# Patient Record
Sex: MALE
[De-identification: names, ages, dates, MRNs, and addresses within clinical notes are randomized per-mention and may not be internally consistent; named-entity substitution may affect disease eponyms.]

---

## 2024-02-12 ENCOUNTER — NON-APPOINTMENT (OUTPATIENT)
Age: 64
End: 2024-02-12

## 2024-02-12 ENCOUNTER — APPOINTMENT (OUTPATIENT)
Dept: FAMILY MEDICINE | Facility: CLINIC | Age: 64
End: 2024-02-12
Payer: MEDICARE

## 2024-02-12 VITALS
SYSTOLIC BLOOD PRESSURE: 125 MMHG | RESPIRATION RATE: 16 BRPM | OXYGEN SATURATION: 98 % | DIASTOLIC BLOOD PRESSURE: 80 MMHG | BODY MASS INDEX: 29.78 KG/M2 | HEART RATE: 50 BPM | HEIGHT: 70 IN | WEIGHT: 208 LBS

## 2024-02-12 DIAGNOSIS — Z12.5 ENCOUNTER FOR SCREENING FOR MALIGNANT NEOPLASM OF PROSTATE: ICD-10-CM

## 2024-02-12 DIAGNOSIS — Z86.59 PERSONAL HISTORY OF OTHER MENTAL AND BEHAVIORAL DISORDERS: ICD-10-CM

## 2024-02-12 DIAGNOSIS — E78.5 HYPERLIPIDEMIA, UNSPECIFIED: ICD-10-CM

## 2024-02-12 DIAGNOSIS — Z00.00 ENCOUNTER FOR GENERAL ADULT MEDICAL EXAMINATION W/OUT ABNORMAL FINDINGS: ICD-10-CM

## 2024-02-12 DIAGNOSIS — I10 ESSENTIAL (PRIMARY) HYPERTENSION: ICD-10-CM

## 2024-02-12 DIAGNOSIS — Z86.73 PERSONAL HISTORY OF TRANSIENT ISCHEMIC ATTACK (TIA), AND CEREBRAL INFARCTION W/OUT RESIDUAL DEFICITS: ICD-10-CM

## 2024-02-12 DIAGNOSIS — F19.90 OTHER PSYCHOACTIVE SUBSTANCE USE, UNSPECIFIED, UNCOMPLICATED: ICD-10-CM

## 2024-02-12 DIAGNOSIS — Z86.718 PERSONAL HISTORY OF OTHER VENOUS THROMBOSIS AND EMBOLISM: ICD-10-CM

## 2024-02-12 DIAGNOSIS — Z78.9 OTHER SPECIFIED HEALTH STATUS: ICD-10-CM

## 2024-02-12 PROCEDURE — G0438: CPT

## 2024-02-12 RX ORDER — LISINOPRIL 2.5 MG/1
2.5 TABLET ORAL
Qty: 90 | Refills: 3 | Status: ACTIVE | COMMUNITY
Start: 1900-01-01 | End: 1900-01-01

## 2024-02-12 RX ORDER — BUPRENORPHINE HYDROCHLORIDE, NALOXONE HYDROCHLORIDE 8; 2 MG/1; MG/1
8-2 FILM, SOLUBLE BUCCAL; SUBLINGUAL
Refills: 0 | Status: ACTIVE | COMMUNITY

## 2024-02-12 RX ORDER — ATORVASTATIN CALCIUM 20 MG/1
20 TABLET, FILM COATED ORAL
Qty: 90 | Refills: 3 | Status: ACTIVE | COMMUNITY
Start: 1900-01-01 | End: 1900-01-01

## 2024-02-12 RX ORDER — UBIDECARENONE/VIT E ACET 100MG-5
25 MCG CAPSULE ORAL
Refills: 0 | Status: ACTIVE | COMMUNITY

## 2024-02-12 NOTE — HISTORY OF PRESENT ILLNESS
[FreeTextEntry1] : Annual and establish care  [de-identified] : 63 year old male presenting to establish care and for an annual physical exam. Patient's former PMD has left the previous practice.  Former PMD: Dr.Alexis Chi PMH: HTN, HLD (lisinopril and statin was started due to stroke, never had elevated cholesterol or blood pressure), 15 years ago stroke - with residual peripherally vision loss bilaterally on the left, Recovering drug addict for 8 years. right LLE DVT - took blood thinner for 6 months (about 10yrs ago), [facility].  PSH: Lipoma removed 8yrs ago Allergies: Levaquin - hives  Follows with psychiatrist - Dr. Arleen Morrow - monthly, prescribed suboxone. Lexapro depression/anxiety/compulsion

## 2024-02-12 NOTE — HISTORY OF PRESENT ILLNESS
[FreeTextEntry1] : Annual and establish care  [de-identified] : 63 year old male presenting to establish care and for an annual physical exam. Patient's former PMD has left the previous practice.  Former PMD: Dr.Alexis Chi PMH: HTN, HLD (lisinopril and statin was started due to stroke, never had elevated cholesterol or blood pressure), 15 years ago stroke - with residual peripherally vision loss bilaterally on the left, Recovering drug addict for 8 years. right LLE DVT - took blood thinner for 6 months (about 10yrs ago), [facility].  PSH: Lipoma removed 8yrs ago Allergies: Levaquin - hives  Follows with psychiatrist - Dr. Arleen Morrow - monthly, prescribed suboxone. Lexapro depression/anxiety/compulsion

## 2024-02-12 NOTE — ASSESSMENT
[FreeTextEntry1] : 63 year old male presented for an annual physical exam.  routine blood work today, blood collected in the office. up to date with screening Referred for colonoscopy  Refilled lisinopril/atorvastatin  will call back with results.

## 2024-02-12 NOTE — HEALTH RISK ASSESSMENT
[Good] : ~his/her~  mood as  good [No] : In the past 12 months have you used drugs other than those required for medical reasons? No [No falls in past year] : Patient reported no falls in the past year [0] : 2) Feeling down, depressed, or hopeless: Not at all (0) [PHQ-2 Negative - No further assessment needed] : PHQ-2 Negative - No further assessment needed [Patient reported colonoscopy was normal] : Patient reported colonoscopy was normal [HIV test declined] : HIV test declined [Hepatitis C test declined] : Hepatitis C test declined [None] : None [With Family] : lives with family [# of Members in Household ___] :  household currently consist of [unfilled] member(s) [Retired] : retired [Single] : single [# Of Children ___] : has [unfilled] children [Feels Safe at Home] : Feels safe at home [Fully functional (bathing, dressing, toileting, transferring, walking, feeding)] : Fully functional (bathing, dressing, toileting, transferring, walking, feeding) [Fully functional (using the telephone, shopping, preparing meals, housekeeping, doing laundry, using] : Fully functional and needs no help or supervision to perform IADLs (using the telephone, shopping, preparing meals, housekeeping, doing laundry, using transportation, managing medications and managing finances) [Reports normal functional visual acuity (ie: able to read med bottle)] : Reports normal functional visual acuity [Former] : Former [> 15 Years] : > 15 Years [de-identified] : quit 8 years ago  [de-identified] : recovering drug addict - IV heroine, crack, coccaine - quit 8yrs ago, on suboxone now [de-identified] : None  [de-identified] : Bad  [[id number]] : 0 [Sexually Active] : not sexually active [High Risk Behavior] : no high risk behavior [Reports changes in hearing] : Reports no changes in hearing [Reports changes in vision] : Reports no changes in vision [Reports changes in dental health] : Reports no changes in dental health [ColonoscopyDate] : 01/14 [ColonoscopyComments] : Referred  [FreeTextEntry3] : 3 daughters

## 2024-02-12 NOTE — HEALTH RISK ASSESSMENT
[Good] : ~his/her~  mood as  good [No] : In the past 12 months have you used drugs other than those required for medical reasons? No [No falls in past year] : Patient reported no falls in the past year [0] : 2) Feeling down, depressed, or hopeless: Not at all (0) [PHQ-2 Negative - No further assessment needed] : PHQ-2 Negative - No further assessment needed [Patient reported colonoscopy was normal] : Patient reported colonoscopy was normal [HIV test declined] : HIV test declined [Hepatitis C test declined] : Hepatitis C test declined [None] : None [With Family] : lives with family [# of Members in Household ___] :  household currently consist of [unfilled] member(s) [Retired] : retired [Single] : single [# Of Children ___] : has [unfilled] children [Feels Safe at Home] : Feels safe at home [Fully functional (bathing, dressing, toileting, transferring, walking, feeding)] : Fully functional (bathing, dressing, toileting, transferring, walking, feeding) [Fully functional (using the telephone, shopping, preparing meals, housekeeping, doing laundry, using] : Fully functional and needs no help or supervision to perform IADLs (using the telephone, shopping, preparing meals, housekeeping, doing laundry, using transportation, managing medications and managing finances) [Reports normal functional visual acuity (ie: able to read med bottle)] : Reports normal functional visual acuity [Former] : Former [> 15 Years] : > 15 Years [de-identified] : quit 8 years ago  [de-identified] : recovering drug addict - IV heroine, crack, coccaine - quit 8yrs ago, on suboxone now [de-identified] : None  [de-identified] : Bad  [[id number]] : 0 [Sexually Active] : not sexually active [High Risk Behavior] : no high risk behavior [Reports changes in hearing] : Reports no changes in hearing [Reports changes in vision] : Reports no changes in vision [Reports changes in dental health] : Reports no changes in dental health [ColonoscopyDate] : 01/14 [ColonoscopyComments] : Referred  [FreeTextEntry3] : 3 daughters

## 2024-02-14 LAB
ALBUMIN SERPL ELPH-MCNC: 4.4 G/DL
ALP BLD-CCNC: 118 U/L
ALT SERPL-CCNC: 17 U/L
ANION GAP SERPL CALC-SCNC: 15 MMOL/L
AST SERPL-CCNC: 17 U/L
BILIRUB SERPL-MCNC: 0.3 MG/DL
BUN SERPL-MCNC: 23 MG/DL
CALCIUM SERPL-MCNC: 9.4 MG/DL
CHLORIDE SERPL-SCNC: 104 MMOL/L
CHOLEST SERPL-MCNC: 146 MG/DL
CO2 SERPL-SCNC: 24 MMOL/L
CREAT SERPL-MCNC: 1.32 MG/DL
EGFR: 60 ML/MIN/1.73M2
ESTIMATED AVERAGE GLUCOSE: 114 MG/DL
GLUCOSE SERPL-MCNC: 99 MG/DL
HBA1C MFR BLD HPLC: 5.6 %
HDLC SERPL-MCNC: 37 MG/DL
LDLC SERPL CALC-MCNC: 78 MG/DL
NONHDLC SERPL-MCNC: 109 MG/DL
POTASSIUM SERPL-SCNC: 4.5 MMOL/L
PROT SERPL-MCNC: 6.9 G/DL
PSA FREE FLD-MCNC: 28 %
PSA FREE SERPL-MCNC: 0.57 NG/ML
PSA SERPL-MCNC: 2.05 NG/ML
SODIUM SERPL-SCNC: 142 MMOL/L
TRIGL SERPL-MCNC: 184 MG/DL
TSH SERPL-ACNC: 2.44 UIU/ML

## 2024-03-21 ENCOUNTER — APPOINTMENT (OUTPATIENT)
Dept: GASTROENTEROLOGY | Facility: CLINIC | Age: 64
End: 2024-03-21
Payer: MEDICARE

## 2024-03-21 VITALS
HEART RATE: 50 BPM | HEIGHT: 70 IN | DIASTOLIC BLOOD PRESSURE: 80 MMHG | BODY MASS INDEX: 29.78 KG/M2 | SYSTOLIC BLOOD PRESSURE: 120 MMHG | WEIGHT: 208 LBS

## 2024-03-21 DIAGNOSIS — K64.8 OTHER HEMORRHOIDS: ICD-10-CM

## 2024-03-21 DIAGNOSIS — Z12.11 ENCOUNTER FOR SCREENING FOR MALIGNANT NEOPLASM OF COLON: ICD-10-CM

## 2024-03-21 DIAGNOSIS — Z86.010 PERSONAL HISTORY OF COLONIC POLYPS: ICD-10-CM

## 2024-03-21 PROCEDURE — 99204 OFFICE O/P NEW MOD 45 MIN: CPT

## 2024-03-21 RX ORDER — ESCITALOPRAM OXALATE 10 MG/1
10 TABLET, FILM COATED ORAL
Refills: 0 | Status: ACTIVE | COMMUNITY

## 2024-03-21 NOTE — HISTORY OF PRESENT ILLNESS
[FreeTextEntry1] :    This HPI reflects a summary and review of records : including previous and most recent  Labs, body imaging, consults and progress notes, operative and pathology reports, EKG reports, ED records, found in [facility],  [facility] and any additional records brought in by  the patient at the time of the visit.   PCP: Dr. Damon  64 yo M w h/o HTN, HLD, CVA, Anxiety/ Depression, s/p DVT Colon Polyps, Hemorrhoids  3/21/24    Today:  Feeling well, no c/o , CP, SOB/ GROSS, Cough, Wheeze, Palpitations, edema  3/21/24   Today:  H/o constipation from suboxone--> BMs# 1-2 w strain                            Now on Miralax 1 cap qd --> BMs;# 4qd  * Abd pain-->no * Nausea--> no * Vomit--> no * Early satiety--> no * Belching--> no * Hiccups--> no * Regurgitation--> no * Acid Taste / Water Brash--> no * Ht burn--> no * Dysphagia--> no * Throat Clearing--> no * Hoarseness--> no * Post-Nasal Drip--> no * Congestion--> no * Globus--> no * Cough--> no * Wheeze / PC-> -no * Diarrhea--> no * Bloating--> no * Incompl Evac--> no * Flatulence--> no * Gurgling--> no * Melena--> no * BPBPR-> -no * Anorexia--> no * Wt. Loss--> no

## 2024-03-21 NOTE — ASSESSMENT
[FreeTextEntry1] :  1. Hemorrhoids:  well - controlled.  No pain,  swelling,  itch,  bleeding * Discussed  the  potential complications of thrombosis,  pain,  infection,  swelling, itching,  bleeding --none recently Recommendations:  * Moderate- High  Fiber Diet was emphasized * 6  --  8 cups of decaffeinated fluid daily was emphasized  * Sitz Bathes as needed ,  No:  Anusol HC  Suppos / Cream  NV BID -- was needed * No:  Tucks BID,  Balneol Lotion,   Calmoseptine Oint -- was needed ;    can use  Prep H prn * No:  need for  Colorectal surgical evaluation for possible ablation       2 Colorectal   Neoplasia  Screening:  to be evaluated   Utilizing teaching posters and anatomical models the following were discussed and emphasized with the patient in detail: * Discussed the pre-malignant potential of polyps * Discussed the importance of f/u surveillance / screening colonoscopy * moderate-High  Fiber Diet was emphasized * Anti-oxidants and ASA/NSAID Therapy emphasized * Given age > 40-49 yo  & +PH Colon polyps * Recommend Colonoscopy  to  R/O  Colonic Neoplasia-- in 2024       Informed Consent: * The risks & Benefits of Colonoscopy were discussed w patient. * This included but was not limited to perforation, bleeding, sedation /med rxns, missed lesions possibly requiring surgery, blood transfusions, antibiotics & CPR/Intubation. * Pt. understands & agrees to the procedures. The following instructions in regards to the prep and medically essential ( cardiac, pulmonary, sz, psych, endocrine)  pre-op medication administration was reviewed and emphasized with the patient .  * Pt. advised to D/C  ASA/NSAIDs  7  Days  PTP. * [ +++ ]  Dulcolax / Miralax / Mag. Citrate ,  [     ] Prepopik/ Clenpiq ,  [     ] Osmo Prep,  [    ] GoLytely,  prep. reviewed w Pt. * Hold  [           ] AM of procedure. * Hold  [           ] PM  before procedure. * Take  [           ] PM  before procedure. * Take  [           ] AM of procedure.

## 2024-03-21 NOTE — ADDENDUM
[FreeTextEntry1] : I spent a total of  45  minutes with this patient encounter . Greater than 50% of the time spent was     devoted to counseling and coordinating care including review of records, pertinent labs     data and studies, as well as discussing diagnosis evaluation and workup, planned     therapeutic interventions and future disposition of care. This includes any additional     research needed to obtain further information in formulating the plan of care of     this patient. This includes counseling the patient about their disease and diagnosis.

## 2024-06-12 ENCOUNTER — APPOINTMENT (OUTPATIENT)
Dept: GASTROENTEROLOGY | Facility: HOSPITAL | Age: 64
End: 2024-06-12